# Patient Record
Sex: MALE | Race: WHITE | Employment: FULL TIME | URBAN - NONMETROPOLITAN AREA
[De-identification: names, ages, dates, MRNs, and addresses within clinical notes are randomized per-mention and may not be internally consistent; named-entity substitution may affect disease eponyms.]

---
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
## 2021-05-06 ENCOUNTER — OFFICE VISIT (OUTPATIENT)
Dept: FAMILY MEDICINE CLINIC | Age: 21
End: 2021-05-06
Payer: COMMERCIAL

## 2021-05-06 VITALS
WEIGHT: 221.8 LBS | HEIGHT: 73 IN | OXYGEN SATURATION: 98 % | BODY MASS INDEX: 29.4 KG/M2 | SYSTOLIC BLOOD PRESSURE: 124 MMHG | HEART RATE: 79 BPM | TEMPERATURE: 98.2 F | DIASTOLIC BLOOD PRESSURE: 80 MMHG

## 2021-05-06 DIAGNOSIS — R10.9 ABDOMINAL PAIN, UNSPECIFIED ABDOMINAL LOCATION: Primary | ICD-10-CM

## 2021-05-06 LAB
BASOPHILS %: 0.67 (ref 0–3)
BASOPHILS ABSOLUTE: 0.06 (ref 0–0.3)
EOSINOPHILS %: 2.21 (ref 0–10)
EOSINOPHILS ABSOLUTE: 0.21 (ref 0–1.1)
HCT VFR BLD CALC: 47.7 % (ref 42–52)
HEMOGLOBIN: 16.2 (ref 13.8–17.8)
LYMPHOCYTE %: 16.49 (ref 20–51.1)
LYMPHOCYTES ABSOLUTE: 1.55 (ref 1–5.5)
MCH RBC QN AUTO: 28.9 PG (ref 28.5–32.5)
MCHC RBC AUTO-ENTMCNC: 34 G/DL (ref 32–37)
MCV RBC AUTO: 85.1 FL (ref 80–94)
MONOCYTES %: 8.55 (ref 1.7–9.3)
MONOCYTES ABSOLUTE: 0.81 (ref 0.1–1)
NEUTROPHILS %: 72.09 (ref 42.2–75.2)
NEUTROPHILS ABSOLUTE: 6.8 (ref 2–8.1)
PDW BLD-RTO: 11.4 % (ref 10–15.5)
PLATELET # BLD: 269.4 THOU/MM3 (ref 130–400)
RBC: 5.6 M/UL (ref 4.7–6.1)
WBC: 9.4 THOU/ML3 (ref 4.8–10.8)

## 2021-05-06 PROCEDURE — 99203 OFFICE O/P NEW LOW 30 MIN: CPT | Performed by: FAMILY MEDICINE

## 2021-05-06 RX ORDER — NAPROXEN 500 MG/1
500 TABLET ORAL 2 TIMES DAILY WITH MEALS
Qty: 60 TABLET | Refills: 0 | Status: SHIPPED | OUTPATIENT
Start: 2021-05-06

## 2021-05-06 SDOH — ECONOMIC STABILITY: TRANSPORTATION INSECURITY
IN THE PAST 12 MONTHS, HAS LACK OF TRANSPORTATION KEPT YOU FROM MEETINGS, WORK, OR FROM GETTING THINGS NEEDED FOR DAILY LIVING?: NO

## 2021-05-06 SDOH — ECONOMIC STABILITY: TRANSPORTATION INSECURITY
IN THE PAST 12 MONTHS, HAS THE LACK OF TRANSPORTATION KEPT YOU FROM MEDICAL APPOINTMENTS OR FROM GETTING MEDICATIONS?: NO

## 2021-05-06 ASSESSMENT — ENCOUNTER SYMPTOMS
ABDOMINAL PAIN: 1
NAUSEA: 0
DIARRHEA: 0
CONSTIPATION: 0
VOMITING: 0

## 2021-05-06 ASSESSMENT — PATIENT HEALTH QUESTIONNAIRE - PHQ9
SUM OF ALL RESPONSES TO PHQ QUESTIONS 1-9: 0
1. LITTLE INTEREST OR PLEASURE IN DOING THINGS: 0
2. FEELING DOWN, DEPRESSED OR HOPELESS: 0
SUM OF ALL RESPONSES TO PHQ QUESTIONS 1-9: 0

## 2021-05-06 NOTE — PROGRESS NOTES
[address] [address]  Dept: [phone]  Dept Fax: [phone]    Marzena Hernandez is a 24 y.o. male who presents today for his medical conditions/complaints as noted below. Marzena Hernandez c/o of Establish Care and Abdominal Pain (RLQ x1wk)      HPI:     HPI  Pt here to get established, not seen in our system prior 3 years. Issues with abdominal pain right lower side past week  Noted more laterally  Began on Friday and worsening on weekend and now improving slowly  No known injury, working out past month, on knees often at work  No fevers, no prior similar sx  No diarrhea, or constipation. No fhx of colitis or chrons    BP Readings from Last 3 Encounters:   05/06/21 124/80   04/29/18 104/70   11/14/16 130/70 (81 %, Z = 0.89 /  46 %, Z = -0.10)*     *BP percentiles are based on the 2017 AAP Clinical Practice Guideline for boys          (goal 120/80)    History reviewed. No pertinent past medical history.    Past Surgical History:   Procedure Laterality Date    BONE CYST EXCISION  01/01/2012    WISDOM TOOTH EXTRACTION  2019       Family History   Adopted: Yes   Problem Relation Age of Onset    High Blood Pressure Father     Lung Cancer Maternal Grandfather     Mult Sclerosis Paternal Grandmother        Social History     Tobacco Use    Smoking status: Never Smoker    Smokeless tobacco: Never Used   Substance Use Topics    Alcohol use: No     Alcohol/week: 0.0 standard drinks      Prior to Visit Medications    Not on File     No Known Allergies    Health Maintenance   Topic Date Due    Hepatitis C screen  Never done    HIV screen  Never done    COVID-19 Vaccine (1) Never done    DTaP/Tdap/Td vaccine (7 - Td) 07/05/2021    Flu vaccine (Season Ended) 09/01/2021    Hepatitis A vaccine  Completed    Hepatitis B vaccine  Completed    Hib vaccine  Completed    HPV vaccine  Completed    Varicella vaccine  Completed    Meningococcal (ACWY) vaccine  Completed    Pneumococcal 0-64 years Vaccine  Aged Out       Subjective:      Review of Systems   Constitutional: Negative for appetite change, fatigue and fever. Gastrointestinal: Positive for abdominal pain. Negative for constipation, diarrhea, nausea and vomiting. Genitourinary: Negative for frequency. Neurological: Negative for headaches. Objective:     /80 (Site: Left Upper Arm, Position: Sitting, Cuff Size: Medium Adult)   Pulse 79   Temp 98.2 °F (36.8 °C) (Temporal)   Ht 6' 1\" (1.854 m)   Wt 221 lb 12.8 oz (100.6 kg)   SpO2 98%   BMI 29.26 kg/m²     Physical Exam  Vitals signs reviewed. Constitutional:       General: He is not in acute distress. Appearance: He is well-developed. HENT:      Head: Atraumatic. Eyes:      Conjunctiva/sclera: Conjunctivae normal.   Neck:      Musculoskeletal: Neck supple. Thyroid: No thyromegaly. Cardiovascular:      Rate and Rhythm: Normal rate and regular rhythm. Heart sounds: No murmur. Pulmonary:      Effort: Pulmonary effort is normal.      Breath sounds: Normal breath sounds. Abdominal:      General: Bowel sounds are normal.      Palpations: Abdomen is soft. Tenderness: There is abdominal tenderness (right lateral mid abdomen). There is guarding (noted right lateral mid abdomen). Hernia: No hernia is present. Musculoskeletal:         General: No swelling (BLE). Right lower leg: He exhibits no swelling. Left lower leg: He exhibits no swelling. Lymphadenopathy:      Cervical: No cervical adenopathy. Neurological:      Mental Status: He is alert and oriented to person, place, and time. Psychiatric:         Mood and Affect: Mood normal.         Thought Content: Thought content normal.         Judgment: Judgment normal.         Assessment:     1. Abdominal pain, unspecified abdominal location      No results found for this visit on 05/06/21.     Call from radiology positive for appendicitis    Plan:     Orders Placed This Encounter   Procedures    US APPENDIX     Standing Status:   Future     Standing Expiration Date:   5/6/2022     Order Specific Question:   Reason for exam:     Answer:   right mid lateral abdomen    CBC Auto Differential     Standing Status:   Future     Standing Expiration Date:   5/6/2022    CBC Auto Differential       Unable to contact gen surg for direct admit so pt will present to ER for expedited care of appy      Return if symptoms worsen or fail to improve. There are no Patient Instructions on file for this visit. All patient questions answered. Pt voiced understanding. Patient agreed with treatment plan. Follow up as directed.      Electronically signed by Mulu Comer MD on 5/6/2021

## 2021-05-12 ENCOUNTER — OFFICE VISIT (OUTPATIENT)
Dept: SURGERY | Age: 21
End: 2021-05-12

## 2021-05-12 VITALS
SYSTOLIC BLOOD PRESSURE: 122 MMHG | DIASTOLIC BLOOD PRESSURE: 69 MMHG | BODY MASS INDEX: 28.89 KG/M2 | HEART RATE: 76 BPM | WEIGHT: 218 LBS | TEMPERATURE: 98.3 F | HEIGHT: 73 IN

## 2021-05-12 DIAGNOSIS — Z09 POSTOP CHECK: Primary | ICD-10-CM

## 2021-05-12 PROCEDURE — 99024 POSTOP FOLLOW-UP VISIT: CPT | Performed by: SURGERY

## 2021-05-12 NOTE — PROGRESS NOTES
Subjective   Dorian Hilario is a 24 y.o. male who presents today for follow-up after recent laparoscopic appendectomy for acute appendicitis. Patient states that since surgery he has been doing well. Pain has been controlled and is no longer taking narcotic pain medication. Using ibuprofen as needed. Tolerating diet without any issues. Having normal bowel function. Denies any incisional problems. History reviewed. No pertinent past medical history. Past Surgical History:   Procedure Laterality Date    APPENDECTOMY  05/06/2021    lap    BONE CYST EXCISION  01/01/2012    WISDOM TOOTH EXTRACTION  2019       Current Outpatient Medications   Medication Sig Dispense Refill    naproxen (NAPROSYN) 500 MG tablet Take 1 tablet by mouth 2 times daily (with meals) 60 tablet 0     No current facility-administered medications for this visit.         No Known Allergies    Family History   Adopted: Yes   Problem Relation Age of Onset    High Blood Pressure Father     Lung Cancer Maternal Grandfather     Mult Sclerosis Paternal Grandmother        Social History     Socioeconomic History    Marital status: Single     Spouse name: Not on file    Number of children: Not on file    Years of education: Not on file    Highest education level: Not on file   Occupational History    Not on file   Social Needs    Financial resource strain: Not hard at all   [place] insecurity     Worry: Never true     Inability: Never true   [email or web address] needs     Medical: No     Non-medical: No   Tobacco Use    Smoking status: Never Smoker    Smokeless tobacco: Never Used   Substance and Sexual Activity    Alcohol use: Yes     Frequency: 2-4 times a month    Drug use: No    Sexual activity: Not on file   Lifestyle    Physical activity     Days per week: Not on file     Minutes per session: Not on file    Stress: Not on file   Relationships    Social connections     Talks on phone: Not on file     Gets together: Not on file     Attends Congregational service: Not on file     Active member of club or organization: Not on file     Attends meetings of clubs or organizations: Not on file     Relationship status: Not on file    Intimate partner violence     Fear of current or ex partner: Not on file     Emotionally abused: Not on file     Physically abused: Not on file     Forced sexual activity: Not on file   Other Topics Concern    Not on file   Social History Narrative    Not on file       ROS:   Review of Systems - Negative except as noted in HPI      Objective   Vitals:    05/12/21 1148   BP: 122/69   Pulse: 76   Temp: 98.3 °F (36.8 °C)     General:in no apparent distress and well developed and well nourished  Eyes: No gross abnormalities. Ears, Nose, Throat: hearing grossly normal bilaterally  Neck: neck supple and non tender without mass  Lungs: clear to auscultation without wheezes or rales   Heart: S1S2, no mumurs, RRR  Abdomen: Soft, nondistended, nontender, bowel sounds present. Incisions intact with glue in place. Some mild ecchymosis at the left lower quadrant incision which is resolving. No signs of infection. Extremity: negative  Neuro: CN II-XII grossly intact      Assessment     3  26-year-old male status post laparoscopic appendectomy for acute appendicitis      Plan     1. Pathology results reviewed with patient and he voices understanding. Final pathology did show evidence of possible small perforation at the level of the appendicolith. Intraoperative findings showed no abscesses or other gross signs of perforation. No evidence of any ongoing intra-abdominal infection. 2.  Patient is advised to continue activity restrictions for a total of 4 weeks at which time he may resume regular activities as tolerated. 3.  Plan for follow-up as needed. The patient is encouraged to call with any questions concerns or problems and we will schedule for follow-up appointment at that time.     Electronically signed by